# Patient Record
Sex: FEMALE | Race: WHITE | HISPANIC OR LATINO
[De-identification: names, ages, dates, MRNs, and addresses within clinical notes are randomized per-mention and may not be internally consistent; named-entity substitution may affect disease eponyms.]

---

## 2020-11-03 ENCOUNTER — APPOINTMENT (OUTPATIENT)
Dept: OTOLARYNGOLOGY | Facility: CLINIC | Age: 20
End: 2020-11-03
Payer: COMMERCIAL

## 2020-11-03 VITALS
HEART RATE: 87 BPM | TEMPERATURE: 98.2 F | SYSTOLIC BLOOD PRESSURE: 110 MMHG | DIASTOLIC BLOOD PRESSURE: 72 MMHG | OXYGEN SATURATION: 99 %

## 2020-11-03 PROCEDURE — 99203 OFFICE O/P NEW LOW 30 MIN: CPT | Mod: 25

## 2020-11-03 PROCEDURE — 31231 NASAL ENDOSCOPY DX: CPT

## 2020-11-03 PROCEDURE — 99072 ADDL SUPL MATRL&STAF TM PHE: CPT

## 2020-11-03 RX ORDER — METHYLPREDNISOLONE 4 MG/1
4 TABLET ORAL
Qty: 1 | Refills: 0 | Status: ACTIVE | COMMUNITY
Start: 2020-11-03 | End: 1900-01-01

## 2020-11-03 RX ORDER — AZITHROMYCIN 250 MG/1
250 TABLET, FILM COATED ORAL
Qty: 1 | Refills: 0 | Status: ACTIVE | COMMUNITY
Start: 2020-11-03 | End: 1900-01-01

## 2020-11-07 ENCOUNTER — APPOINTMENT (OUTPATIENT)
Dept: CT IMAGING | Facility: HOSPITAL | Age: 20
End: 2020-11-07

## 2020-11-17 NOTE — CONSULT LETTER
[Dear  ___] : Dear  [unfilled], [Consult Letter:] : I had the pleasure of evaluating your patient, [unfilled]. [Please see my note below.] : Please see my note below. [Consult Closing:] : Thank you very much for allowing me to participate in the care of this patient.  If you have any questions, please do not hesitate to contact me. [Sincerely,] : Sincerely, [FreeTextEntry3] : Dk Galvez MD, FACS\par Professor of Otolaryngology, [employer] at [facility]\par Director, [facility], Department of Otolaryngology, [facility]\par , Head & Neck Service Line, [facility]\par

## 2020-11-17 NOTE — PROCEDURE
[Flexible Endoscope] : examined with the flexible endoscope [Congested] : congested [Allergic] : allergic signs [Adele] : adele [Image(s) Captured] : image(s) captured and filed [FreeTextEntry6] : severe rhinitis\par DNS [de-identified] : Severe Rhinitis and Nasal Congestion.  Turbinate Hypertrophy

## 2020-11-17 NOTE — HISTORY OF PRESENT ILLNESS
[SMR] : submucous resection (SMR) [Rhinoplasty] : rhinoplasty [de-identified] : 20 years old female patient with history Persistent nasal congestion for the past couple of years.   Patient is present today in the office with Severe Rhinitis and Nasal Congestion.  Turbinate Hypertrophy  [de-identified] : Rhinoplasty in [facility]

## 2020-11-17 NOTE — REASON FOR VISIT
[Initial Evaluation] : an initial evaluation for [FreeTextEntry2] : Persistent nasal congestion for the past couple of years.  Patient states her level of severity is a level 9 out of 10 and it occurs constant.  Patient states nothing helps to improve or worsens her Persistent nasal congestion for the past couple of years.

## 2020-11-17 NOTE — REVIEW OF SYSTEMS
[Patient Intake Form Reviewed] : Patient intake form was reviewed [Nasal Congestion] : nasal congestion [Shortness Of Breath] : shortness of breath [As Noted in HPI] : as noted in HPI [Abdominal Pain] : abdominal pain [Diarrhea] : diarrhea [Heartburn] : heartburn [Negative] : Heme/Lymph [FreeTextEntry4] : Facial pain

## 2020-11-24 ENCOUNTER — APPOINTMENT (OUTPATIENT)
Dept: OTOLARYNGOLOGY | Facility: CLINIC | Age: 20
End: 2020-11-24
Payer: COMMERCIAL

## 2020-11-24 VITALS
BODY MASS INDEX: 20.83 KG/M2 | HEIGHT: 65 IN | TEMPERATURE: 97.8 F | HEART RATE: 81 BPM | OXYGEN SATURATION: 98 % | RESPIRATION RATE: 14 BRPM | SYSTOLIC BLOOD PRESSURE: 103 MMHG | DIASTOLIC BLOOD PRESSURE: 70 MMHG | WEIGHT: 125 LBS

## 2020-11-24 PROCEDURE — 99214 OFFICE O/P EST MOD 30 MIN: CPT

## 2020-11-24 NOTE — PHYSICAL EXAM
[Normal] : mucosa is normal [Midline] : trachea located in midline position [de-identified] : severe rhinitis

## 2020-11-24 NOTE — ASSESSMENT
[FreeTextEntry1] : 20F who returns for nasal congestion, found to have turbinate hypertrophy. \par \par Plan:\par - in office laser ablation of inferior turbinates and swell bodies\par - pre-procedure COVID\par

## 2020-11-24 NOTE — HISTORY OF PRESENT ILLNESS
[de-identified] : 20F who returns with nasal congestion, found previously to have nasal turbinate hypertrophy. She denies any purulent nasal drainage, facial pain/pressure or anosmia. No other ENT complaints. \par \par

## 2020-11-30 ENCOUNTER — APPOINTMENT (OUTPATIENT)
Dept: OTOLARYNGOLOGY | Facility: CLINIC | Age: 20
End: 2020-11-30
Payer: COMMERCIAL

## 2020-11-30 DIAGNOSIS — Z01.818 ENCOUNTER FOR OTHER PREPROCEDURAL EXAMINATION: ICD-10-CM

## 2020-11-30 PROCEDURE — ZZZZZ: CPT

## 2020-12-02 ENCOUNTER — APPOINTMENT (OUTPATIENT)
Dept: OTOLARYNGOLOGY | Facility: CLINIC | Age: 20
End: 2020-12-02
Payer: COMMERCIAL

## 2020-12-02 VITALS
RESPIRATION RATE: 15 BRPM | TEMPERATURE: 98.1 F | OXYGEN SATURATION: 99 % | SYSTOLIC BLOOD PRESSURE: 122 MMHG | HEART RATE: 87 BPM | DIASTOLIC BLOOD PRESSURE: 86 MMHG

## 2020-12-02 LAB — SARS-COV-2 N GENE NPH QL NAA+PROBE: NOT DETECTED

## 2020-12-02 PROCEDURE — 30117 REMOVAL OF INTRANASAL LESION: CPT | Mod: LT

## 2020-12-02 PROCEDURE — 99072 ADDL SUPL MATRL&STAF TM PHE: CPT

## 2020-12-02 NOTE — REASON FOR VISIT
[Subsequent Evaluation] : a subsequent evaluation for [FreeTextEntry2] : Persistent nasal congestion for the past couple of years.

## 2020-12-02 NOTE — PHYSICAL EXAM
[Midline] : trachea located in midline position [Normal] : no rashes [de-identified] : severe rhinitis

## 2020-12-02 NOTE — HISTORY OF PRESENT ILLNESS
[de-identified] : 20 years old female patient with history of  nasal congestion, found previously to have nasal turbinate hypertrophy. She denies any purulent nasal drainage, facial pain/pressure or anosmia.  Patient is present today in the office for Laser Assisted Turbinate Reduction and Ablation of Nasal Swell bodies \par \par

## 2020-12-02 NOTE — PROCEDURE
[FreeTextEntry1] : Laser Assisted Turbinate Reduction and Ablation of Nasal Swell bodies ( Diode laser )  [FreeTextEntry3] : Laser Assisted Turbinate Reduction and Ablation of Nasal Swell bodies ( Diode Laser )

## 2020-12-02 NOTE — REVIEW OF SYSTEMS
[Patient Intake Form Reviewed] : Patient intake form was reviewed [As Noted in HPI] : as noted in HPI [Nasal Congestion] : nasal congestion [Negative] : Heme/Lymph [FreeTextEntry1] : all other ROS negative

## 2020-12-09 ENCOUNTER — APPOINTMENT (OUTPATIENT)
Dept: OTOLARYNGOLOGY | Facility: CLINIC | Age: 20
End: 2020-12-09
Payer: COMMERCIAL

## 2020-12-09 VITALS
HEART RATE: 104 BPM | RESPIRATION RATE: 17 BRPM | TEMPERATURE: 98 F | DIASTOLIC BLOOD PRESSURE: 77 MMHG | SYSTOLIC BLOOD PRESSURE: 107 MMHG | OXYGEN SATURATION: 98 %

## 2020-12-09 PROCEDURE — 99024 POSTOP FOLLOW-UP VISIT: CPT

## 2020-12-09 PROCEDURE — 31237 NSL/SINS NDSC SURG BX POLYPC: CPT | Mod: 50,58

## 2020-12-09 NOTE — PROCEDURE
[Debridement] : debridement  [Bilateral] : bilateral debridement of the nasal cavity [Severe] : severe [Orestes] : on both sides [Removed] : which was removed

## 2020-12-09 NOTE — REASON FOR VISIT
[Subsequent Evaluation] : a subsequent evaluation for [FreeTextEntry2] : status post Laser Assisted Turbinate Reduction and Ablation of Nasal Swell bodies

## 2020-12-09 NOTE — PHYSICAL EXAM
[Midline] : trachea located in midline position [Normal] : no rashes [de-identified] : severe rhinitis

## 2020-12-09 NOTE — HISTORY OF PRESENT ILLNESS
[de-identified] : 20 years old female patient with history of  Laser Assisted Turbinate Reduction and Ablation of Nasal Swell bodies . She denies any purulent nasal drainage, facial pain/pressure or anosmia.  Patient is present today in the office for nasal debridement \par \par

## 2020-12-15 ENCOUNTER — APPOINTMENT (OUTPATIENT)
Dept: OTOLARYNGOLOGY | Facility: CLINIC | Age: 20
End: 2020-12-15
Payer: COMMERCIAL

## 2020-12-15 VITALS
OXYGEN SATURATION: 96 % | SYSTOLIC BLOOD PRESSURE: 104 MMHG | HEART RATE: 68 BPM | TEMPERATURE: 97.9 F | DIASTOLIC BLOOD PRESSURE: 57 MMHG

## 2020-12-15 PROCEDURE — 31237 NSL/SINS NDSC SURG BX POLYPC: CPT | Mod: 50,58

## 2020-12-15 PROCEDURE — 99024 POSTOP FOLLOW-UP VISIT: CPT

## 2020-12-15 NOTE — HISTORY OF PRESENT ILLNESS
[de-identified] : 20 years old female patient with history of Status post  Laser Assisted Turbinate Reduction and Ablation of Nasal Swell bodies . She denies any purulent nasal drainage, facial pain/pressure or anosmia.  Patient is present today in the office for nasal debridement \par \par

## 2020-12-15 NOTE — PHYSICAL EXAM
[Midline] : trachea located in midline position [Normal] : no rashes [de-identified] : severe rhinitis

## 2020-12-15 NOTE — PROCEDURE
[Image(s) Captured] : image(s) captured and filed [Debridement] : debridement  [Topical Lidocaine] : topical lidocaine [Rigid Endoscope] : examined with a rigid endoscope [Serial Number: ___] : Serial Number: [unfilled] [Congested] : congested [Bilateral] : bilateral debridement of the nasal cavity [Moderate] : moderate [Orestes] : on both sides [Removed] : which was removed

## 2022-02-04 ENCOUNTER — APPOINTMENT (OUTPATIENT)
Dept: OTOLARYNGOLOGY | Facility: CLINIC | Age: 22
End: 2022-02-04
Payer: COMMERCIAL

## 2022-02-04 ENCOUNTER — NON-APPOINTMENT (OUTPATIENT)
Age: 22
End: 2022-02-04

## 2022-02-04 DIAGNOSIS — R22.0 LOCALIZED SWELLING, MASS AND LUMP, HEAD: ICD-10-CM

## 2022-02-04 PROCEDURE — 31231 NASAL ENDOSCOPY DX: CPT

## 2022-02-04 PROCEDURE — 99214 OFFICE O/P EST MOD 30 MIN: CPT | Mod: 25

## 2022-02-04 RX ORDER — PREDNISONE 20 MG/1
20 TABLET ORAL
Qty: 21 | Refills: 0 | Status: ACTIVE | COMMUNITY
Start: 2022-02-04 | End: 1900-01-01

## 2022-02-04 RX ORDER — DOXYCYCLINE HYCLATE 100 MG/1
100 CAPSULE ORAL DAILY
Qty: 14 | Refills: 0 | Status: ACTIVE | COMMUNITY
Start: 2022-02-04 | End: 1900-01-01

## 2022-02-04 NOTE — PHYSICAL EXAM
[de-identified] : nasal valve collapse, improvement with [place] maneuver and lateralization [de-identified] : edema  [Normal] : mucosa is normal [Midline] : trachea located in midline position

## 2022-02-04 NOTE — HISTORY OF PRESENT ILLNESS
[de-identified] : 21 year old woman with nasal obstruction s/p multiple nasal surgeries including septoplasty and rhinoplasty and sinus surgery. Pt has breathing that alternates. Pt has shortness of breath with speaking. Pt had swelling recently that was cauterized.  [Clear Rhinorrhea] : clear rhinorrhea [Facial Pressure] : facial pressure [Nasal Congestion] : nasal congestion [None] : No associated symptoms are reported.

## 2022-02-25 ENCOUNTER — APPOINTMENT (OUTPATIENT)
Dept: OTOLARYNGOLOGY | Facility: CLINIC | Age: 22
End: 2022-02-25
Payer: COMMERCIAL

## 2022-02-25 VITALS
SYSTOLIC BLOOD PRESSURE: 106 MMHG | HEART RATE: 70 BPM | DIASTOLIC BLOOD PRESSURE: 60 MMHG | TEMPERATURE: 97.6 F | BODY MASS INDEX: 21.66 KG/M2 | HEIGHT: 65 IN | WEIGHT: 130 LBS

## 2022-02-25 PROCEDURE — 99213 OFFICE O/P EST LOW 20 MIN: CPT | Mod: 25

## 2022-02-25 PROCEDURE — 31231 NASAL ENDOSCOPY DX: CPT

## 2022-02-25 RX ORDER — FLUTICASONE PROPIONATE 50 UG/1
50 SPRAY, METERED NASAL DAILY
Qty: 1 | Refills: 6 | Status: ACTIVE | COMMUNITY
Start: 2022-02-25 | End: 1900-01-01

## 2022-02-25 NOTE — HISTORY OF PRESENT ILLNESS
[de-identified] : 21 year old woman returns with nasal obstruction s/p multiple nasal surgeries including septoplasty and rhinoplasty and sinus surgery.\par \par 3 weeks ago was seen for worsening breathing and nasal congestion. She had a septoplasty and rhinoplasty in the past but feels her deviated septum is worsening. She was given Doxycycline, Medrol Dosepak, and Flonase that helped with her breathing and congestion. She stopped taking Flonase and her breathing worsesned again.

## 2022-02-25 NOTE — PHYSICAL EXAM
[de-identified] : nasal valve collapse, improvement with [place] maneuver and lateralization [de-identified] : edema  [Normal] : mucosa is normal [Midline] : trachea located in midline position

## 2022-05-13 ENCOUNTER — APPOINTMENT (OUTPATIENT)
Dept: OTOLARYNGOLOGY | Facility: CLINIC | Age: 22
End: 2022-05-13
Payer: COMMERCIAL

## 2022-05-13 VITALS
WEIGHT: 120 LBS | BODY MASS INDEX: 22.08 KG/M2 | HEART RATE: 69 BPM | HEIGHT: 62 IN | DIASTOLIC BLOOD PRESSURE: 67 MMHG | TEMPERATURE: 97.1 F | SYSTOLIC BLOOD PRESSURE: 97 MMHG

## 2022-05-13 DIAGNOSIS — J30.9 ALLERGIC RHINITIS, UNSPECIFIED: ICD-10-CM

## 2022-05-13 DIAGNOSIS — J32.9 CHRONIC SINUSITIS, UNSPECIFIED: ICD-10-CM

## 2022-05-13 DIAGNOSIS — J34.2 DEVIATED NASAL SEPTUM: ICD-10-CM

## 2022-05-13 DIAGNOSIS — J34.3 HYPERTROPHY OF NASAL TURBINATES: ICD-10-CM

## 2022-05-13 PROCEDURE — 31575 DIAGNOSTIC LARYNGOSCOPY: CPT

## 2022-05-13 PROCEDURE — 99214 OFFICE O/P EST MOD 30 MIN: CPT | Mod: 25

## 2022-05-13 NOTE — PROCEDURE
[Hoarseness] : hoarseness not clearly evaluated by indirect laryngoscopy [Topical Lidocaine] : topical lidocaine [Oxymetazoline HCl] : oxymetazoline HCl [Flexible Endoscope] : examined with the flexible endoscope [Normal] : normal vallecula [True Vocal Cords Kaminski's Nodules] : bilateral true vocal cord nodule(s) [de-identified] : bilateral vocal fold nodules

## 2022-05-13 NOTE — PHYSICAL EXAM
[Midline] : trachea located in midline position [Laryngoscopy Performed] : laryngoscopy was performed, see procedure section for findings [Normal] : no abnormal secretions

## 2022-05-13 NOTE — HISTORY OF PRESENT ILLNESS
[de-identified] : 21 year old woman returns with nasal obstruction s/p multiple nasal surgeries including septoplasty and rhinoplasty and sinus surgery.\par \par 3 weeks ago was seen for worsening breathing and nasal congestion. She had a septoplasty and rhinoplasty in the past but feels her deviated septum is worsening. She was given Doxycycline, Medrol Dosepak, and Flonase that helped with her breathing and congestion. She stopped taking Flonase and her breathing worsesned again.\par - [FreeTextEntry1] : 5/13/22\par She has been feeling better as far as the congestion. She is using Flonase as needed. She got laryngitis and her voice has not returned. The more she talks, the worse her voice gets. Her right nostril is congested.

## 2022-12-07 ENCOUNTER — APPOINTMENT (OUTPATIENT)
Dept: OTOLARYNGOLOGY | Facility: CLINIC | Age: 22
End: 2022-12-07

## 2022-12-07 VITALS
BODY MASS INDEX: 22.08 KG/M2 | SYSTOLIC BLOOD PRESSURE: 109 MMHG | WEIGHT: 120 LBS | HEART RATE: 87 BPM | HEIGHT: 62 IN | DIASTOLIC BLOOD PRESSURE: 68 MMHG | TEMPERATURE: 97.9 F

## 2022-12-07 DIAGNOSIS — R09.89 OTHER SPECIFIED SYMPTOMS AND SIGNS INVOLVING THE CIRCULATORY AND RESPIRATORY SYSTEMS: ICD-10-CM

## 2022-12-07 DIAGNOSIS — J34.89 OTHER SPECIFIED DISORDERS OF NOSE AND NASAL SINUSES: ICD-10-CM

## 2022-12-07 DIAGNOSIS — J38.2 NODULES OF VOCAL CORDS: ICD-10-CM

## 2022-12-07 DIAGNOSIS — R09.81 NASAL CONGESTION: ICD-10-CM

## 2022-12-07 DIAGNOSIS — J31.0 CHRONIC RHINITIS: ICD-10-CM

## 2022-12-07 DIAGNOSIS — K21.9 GASTRO-ESOPHAGEAL REFLUX DISEASE W/OUT ESOPHAGITIS: ICD-10-CM

## 2022-12-07 PROCEDURE — 99214 OFFICE O/P EST MOD 30 MIN: CPT | Mod: 25

## 2022-12-07 PROCEDURE — 31575 DIAGNOSTIC LARYNGOSCOPY: CPT

## 2022-12-07 RX ORDER — PANTOPRAZOLE 40 MG/1
40 TABLET, DELAYED RELEASE ORAL
Qty: 30 | Refills: 3 | Status: ACTIVE | OUTPATIENT
Start: 2022-05-13

## 2022-12-07 RX ORDER — FAMOTIDINE 40 MG/1
40 TABLET, FILM COATED ORAL
Qty: 30 | Refills: 3 | Status: ACTIVE | COMMUNITY
Start: 2022-05-13 | End: 1900-01-01

## 2022-12-07 RX ORDER — FLUTICASONE PROPIONATE 50 UG/1
50 SPRAY, METERED NASAL TWICE DAILY
Qty: 3 | Refills: 3 | Status: ACTIVE | COMMUNITY
Start: 2022-02-04 | End: 1900-01-01

## 2022-12-07 NOTE — HISTORY OF PRESENT ILLNESS
[de-identified] : 21 year old woman returns with nasal obstruction s/p multiple nasal surgeries including septoplasty and rhinoplasty and sinus surgery.\par \par 3 weeks ago was seen for worsening breathing and nasal congestion. She had a septoplasty and rhinoplasty in the past but feels her deviated septum is worsening. She was given Doxycycline, Medrol Dosepak, and Flonase that helped with her breathing and congestion. She stopped taking Flonase and her breathing worsesned again.\par - \par  5/13/22\par She has been feeling better as far as the congestion. She is using Flonase as needed. She got laryngitis and her voice has not returned. The more she talks, the worse her voice gets. Her right nostril is congested.\par - [FreeTextEntry1] : Update 12/7/22\par Patient presents for follow up on voice complaints.

## 2022-12-07 NOTE — REASON FOR VISIT
[Subsequent Evaluation] : a subsequent evaluation for [FreeTextEntry2] : nasal congestion and change in voice

## 2022-12-07 NOTE — ASSESSMENT
[FreeTextEntry1] : 21 year old woman returns with nasal obstruction s/p multiple nasal surgeries including septoplasty and rhinoplasty and sinus surgery.  3 weeks ago was seen for worsening breathing and nasal congestion. She had a septoplasty and rhinoplasty in the past but feels her deviated septum is worsening. She was given Doxycycline, Medrol Dosepak, and Flonase that helped with her breathing and congestion. She stopped taking Flonase and her breathing worsesned again. -  Interval History: 5/13/22 She has been feeling better as far as the congestion. She is using Flonase as needed. She got laryngitis and her voice has not returned. The more she talks, the worse her voice gets. Her right nostril is congested.

## 2022-12-07 NOTE — PROCEDURE
[Hoarseness] : hoarseness not clearly evaluated by indirect laryngoscopy [Globus] : globus [Topical Lidocaine] : topical lidocaine [Oxymetazoline HCl] : oxymetazoline HCl [Flexible Endoscope] : examined with the flexible endoscope [Lesion(s)] : lesion(s) [Normal] : the false vocal folds were pink and regular, the ventricular sulcus was open, the true vocal folds were glistening white, tense and of equal length, mobility, and height [True Vocal Cords Kaminski's Nodules] : bilateral true vocal cord nodule(s) [Glottis Arytenoid Cartilages] : no arytenoid granulomas [Glottis Arytenoid Cartilages Erythema] : bilateral arytenoid ~M erythema [Arytenoid Edema ___ /4] : arytenoid edema [unfilled]U/4 [Arytenoid Erythema ___ /4] : arytenoid erythema [unfilled]U/4 [Interarytenoid Edema] : interarytenoid area edematous [de-identified] : bilateral vocal fold nodules

## 2023-02-13 ENCOUNTER — APPOINTMENT (OUTPATIENT)
Dept: OTOLARYNGOLOGY | Facility: CLINIC | Age: 23
End: 2023-02-13

## 2024-04-11 ENCOUNTER — APPOINTMENT (OUTPATIENT)
Dept: ULTRASOUND IMAGING | Facility: HOSPITAL | Age: 24
End: 2024-04-11

## 2024-04-11 ENCOUNTER — OUTPATIENT (OUTPATIENT)
Dept: OUTPATIENT SERVICES | Facility: HOSPITAL | Age: 24
LOS: 1 days | End: 2024-04-11
Payer: COMMERCIAL

## 2024-04-11 PROCEDURE — 76856 US EXAM PELVIC COMPLETE: CPT | Mod: 26,59

## 2024-04-11 PROCEDURE — 76830 TRANSVAGINAL US NON-OB: CPT | Mod: 26

## 2024-04-11 PROCEDURE — 76830 TRANSVAGINAL US NON-OB: CPT

## 2024-04-11 PROCEDURE — 76856 US EXAM PELVIC COMPLETE: CPT
